# Patient Record
Sex: FEMALE | Race: WHITE | NOT HISPANIC OR LATINO | Employment: UNEMPLOYED | URBAN - METROPOLITAN AREA
[De-identification: names, ages, dates, MRNs, and addresses within clinical notes are randomized per-mention and may not be internally consistent; named-entity substitution may affect disease eponyms.]

---

## 2017-04-11 ENCOUNTER — HOSPITAL ENCOUNTER (EMERGENCY)
Facility: HOSPITAL | Age: 47
Discharge: HOME | End: 2017-04-11
Attending: EMERGENCY MEDICINE | Admitting: EMERGENCY MEDICINE

## 2017-04-11 ENCOUNTER — APPOINTMENT (OUTPATIENT)
Dept: GENERAL RADIOLOGY | Facility: HOSPITAL | Age: 47
End: 2017-04-11

## 2017-04-11 VITALS
OXYGEN SATURATION: 98 % | DIASTOLIC BLOOD PRESSURE: 78 MMHG | HEART RATE: 94 BPM | HEIGHT: 61 IN | BODY MASS INDEX: 26.43 KG/M2 | WEIGHT: 140 LBS | RESPIRATION RATE: 18 BRPM | TEMPERATURE: 98.4 F | SYSTOLIC BLOOD PRESSURE: 129 MMHG

## 2017-04-11 DIAGNOSIS — S40.012A CONTUSION OF LEFT SHOULDER, INITIAL ENCOUNTER: Primary | ICD-10-CM

## 2017-04-11 PROCEDURE — 73030 X-RAY EXAM OF SHOULDER: CPT

## 2017-04-11 PROCEDURE — 99283 EMERGENCY DEPT VISIT LOW MDM: CPT

## 2017-04-11 RX ORDER — PAROXETINE 10 MG/1
10 TABLET, FILM COATED ORAL EVERY MORNING
COMMUNITY

## 2017-04-11 RX ORDER — ASPIRIN 81 MG/1
81 TABLET, CHEWABLE ORAL DAILY
COMMUNITY

## 2017-04-11 RX ORDER — HYDROCODONE BITARTRATE AND ACETAMINOPHEN 7.5; 325 MG/1; MG/1
1 TABLET ORAL ONCE
Status: COMPLETED | OUTPATIENT
Start: 2017-04-11 | End: 2017-04-11

## 2017-04-11 RX ORDER — GLIPIZIDE 2.5 MG/1
2.5 TABLET, EXTENDED RELEASE ORAL DAILY
COMMUNITY

## 2017-04-11 RX ADMIN — HYDROCODONE BITARTRATE AND ACETAMINOPHEN 1 TABLET: 7.5; 325 TABLET ORAL at 19:01

## 2017-04-11 NOTE — ED PROVIDER NOTES
EMERGENCY DEPARTMENT ENCOUNTER    CHIEF COMPLAINT  Chief Complaint: shoulder injury  History given by: patient  History limited by: N/A  Room Number: HALE/E  PMD: No Known Provider      HPI:  Pt is a 46 y.o. female who presents with shoulder injury. Today, while pt was carrying a laundry basket down stair steps at home, she missed the last two steps and stumbled forward against the wall. She states that she did not fall and has subsequent left shoulder pain which is exacerbated by left shoulder movement. She denies blow to head, loss of consciousness, neck pain, back pain, abd pain, chest pain, sensory loss, motor loss, and sustaining any other injury. Past Medical History of depression and anxiety.     Duration: occurred today prior to ED arrival  Timing: intermittent   Location: left shoulder  Radiation: none  Quality: pain   Intensity/Severity: moderate  Progression: unchanged  Associated Symptoms: none  Aggravating Factors: left shoulder movement  Alleviating Factors: resting left shoulder  Previous Episodes: pt has previous hx of right shoulder injury  Treatment before arrival: none mentioned    PAST MEDICAL HISTORY  Active Ambulatory Problems     Diagnosis Date Noted   • No Active Ambulatory Problems     Resolved Ambulatory Problems     Diagnosis Date Noted   • No Resolved Ambulatory Problems     Past Medical History:   Diagnosis Date   • Arthritis    • Depression    • Diabetes mellitus        PAST SURGICAL HISTORY  Past Surgical History:   Procedure Laterality Date   • EAR TUBES         FAMILY HISTORY  History reviewed. No pertinent family history.    SOCIAL HISTORY  Social History     Social History   • Marital status:      Spouse name: N/A   • Number of children: N/A   • Years of education: N/A     Occupational History   • Not on file.     Social History Main Topics   • Smoking status: Former Smoker     Quit date: 4/11/2010   • Smokeless tobacco: Never Used   • Alcohol use No   • Drug use: No   •  Sexual activity: Defer     Other Topics Concern   • Not on file     Social History Narrative   • No narrative on file         ALLERGIES  E-mycin [erythromycin]    REVIEW OF SYSTEMS  Review of Systems   Constitutional: Negative for chills and fever.   HENT: Negative for sore throat.    Gastrointestinal: Negative for nausea and vomiting.   Genitourinary: Negative for dysuria.   Musculoskeletal: Negative for back pain and neck pain.        Left shoulder pain   Neurological: Negative for weakness and numbness.   Psychiatric/Behavioral: The patient is not nervous/anxious.        PHYSICAL EXAM  ED Triage Vitals   Temp Heart Rate Resp BP SpO2   04/11/17 1747 04/11/17 1747 04/11/17 1747 04/11/17 1752 04/11/17 1747   99 °F (37.2 °C) 115 18 140/95 99 % WNL       Physical Exam   Constitutional: She is oriented to person, place, and time and well-developed, well-nourished, and in no distress.   HENT:   Head: Normocephalic and atraumatic.   Mouth/Throat: Mucous membranes are normal.   Eyes: No scleral icterus.   Neck: Normal range of motion.   No c-spine tenderness   Cardiovascular: Normal rate, regular rhythm, normal heart sounds and intact distal pulses.    Pulses:       Radial pulses are 2+ on the right side, and 2+ on the left side.   Pulmonary/Chest: Effort normal and breath sounds normal. No respiratory distress. She exhibits no tenderness.   Abdominal: Soft. There is no tenderness.   Musculoskeletal:        Left shoulder: She exhibits tenderness and pain (pain with abduction). She exhibits normal pulse.   Able to supinate and pronate with LUE, able to reach across body and touch right shoulder with left hand, NV intact distally to left hand   Neurological: She is alert and oriented to person, place, and time. She has normal sensation and normal strength.   Equal  strength bilaterally   Skin: Skin is warm and dry.   Psychiatric: Mood and affect normal.   Nursing note and vitals reviewed.          RADIOLOGY         XR  Shoulder 2+ View Left (Final result) Result time: 04/11/17 19:36:07     Final result by Alfonso Rincon MD (04/11/17 19:36:07)     Impression:        No acute fracture identified. With persistent clinical indication, MRI  can be considered for further evaluation.     This report was finalized on 4/11/2017 7:36 PM by Dr. Alfonso Rincon MD.        Narrative:     XR SHOULDER 2+ VW LEFT-     INDICATIONS: Pain after injury     TECHNIQUE: 2 VIEWS OF THE LEFT SHOULDER     COMPARISON: None available     FINDINGS:      No acute fracture is identified. No dislocation. Moderate hypertrophic  degenerative change is seen at the acromioclavicular joint. Soft tissues  appear unremarkable.          I ordered the above noted radiological studies and reviewed the images on the PACS system.            PROGRESS AND CONSULTS  6:50 PM- Ordered norco for pain.   8:17 PM- Reviewed pt's history and workup with Dr. Mancilla.  At bedside evaluation, they agree with the plan of care.  8:18 PM- Rechecked pt. She is resting comfortably and is in no acute distress. Her pain has improved after ED treatment. Reviewed implications of results (nothing acute indicated on left shoulder xray), diagnosis, meds, responsibility to follow up, warning signs and symptoms of possible worsening, potential complications and reasons to return to ER with patient.  Discussed all results and noted any abnormalities with patient.  Discussed absolute need to recheck abnormalities and condition with referred orthopedic surgeon in 5 to 7 days if sx do not improve. Informed pt of plan to place left shoulder in sling for support. Advised pt to wear sling, to apply ice to left shoulder, and to take ibuprofen for pain.   Discussed plan for discharge, as there is no emergent indication for admission.  Pt is agreeable and understands need for follow up and repeat testing.  Pt is aware that discharge does not mean that nothing is wrong but it indicates no emergency is  "present.  Pt is discharged with instructions to follow up with primary care doctor to have their blood pressure rechecked.   8:22 PM- Nursing staff to place left shoulder in sling.       DIAGNOSIS  Final diagnoses:   Contusion of left shoulder, initial encounter       FOLLOW UP   [facility] REFERRAL [facility]  [facility] [address]  [phone]  Schedule an appointment as soon as possible for a visit  If symptoms worsen    Salvador Núñez MD  [address] [facility]  [place] [address]  [phone]    Schedule an appointment as soon as possible for a visit in 1 week  If symptoms worsen      COURSE & MEDICAL DECISION MAKING  Pertinent Imaging studies that were ordered and reviewed are noted above.  Results were reviewed/discussed with the patient and they were also made aware of online assess.       MEDICATIONS GIVEN IN ER  Medications   HYDROcodone-acetaminophen (NORCO) 7.5-325 MG per tablet 1 tablet (1 tablet Oral Given 4/11/17 1901)       /78  Pulse 94  Temp 98.4 °F (36.9 °C)  Resp 18  Ht 61\" (154.9 cm)  Wt 140 lb (63.5 kg)  SpO2 98%  BMI 26.45 kg/m2      I personally reviewed the past medical history, past surgical history, social history, family history, current medications and allergies as they appear in this chart.  The scribe's note accurately reflects the work and decisions made by me.     I personally scribed for ARIS Castellanos on 4/11/2017 at 8:22 PM.  Electronically signed by Quinton Mccracken on 4/11/2017 at time 8:22 PM       Quinton Mccracken  04/11/17 2033       ELDON Zendejas  04/11/17 2208    "

## 2017-04-11 NOTE — ED TRIAGE NOTES
Pt states she was carrying a basket of laundry, missed a step and fell into the wall against her left shoulder

## 2017-04-12 NOTE — DISCHARGE INSTRUCTIONS
Ibuprofen every 8 hours as needed for pain  Rest, ice and wear sling for 3 days  Follow up with Dr Núñez in 5-7 days if symptoms not improving  Return to er for increased pain, decreased range of motion, or any new or worsening symptoms

## 2017-04-12 NOTE — ED PROVIDER NOTES
Pt presents to the ED c/o L shoulder pain onset earlier today. Upon exam, pt has tenderness to the anterior shoulder with limited ROM due to pain. The exam is otherwise unremarkable. Imaging shows no fx. Instructed the pt to apply ice to the affected area and to do exercises to ensure the ROM of her shoulder does not become compromised. I agree with the plan for discharge.    I supervised care provided by the midlevel provider.    We have discussed this patient's history, physical exam, and treatment plan.   I have reviewed the note and personally saw and examined the patient and agree with the plan of care.    Documentation assistance provided by odette Clifford for Dr. Mancilla.  Information recorded by the scribe was done at my direction and has been verified and validated by me.     Eddy Clifford  04/11/17 6760       Tomasz Mancilla MD  04/11/17 2338